# Patient Record
(demographics unavailable — no encounter records)

---

## 2024-11-08 NOTE — DISCUSSION/SUMMARY
[de-identified] : 40-year-old male status post L3 L5 laminectomy done for neurologic deficit that was caused by a work-related accident.  Patient is progressing appropriately.  Continue physical therapy.  Follow-up with me in 3 months.  Work documentation provided.  He is 100% temporarily disabled and advised to stay out of work.

## 2024-11-08 NOTE — IMAGING
[de-identified] : Right ankle dorsiflexion 5 out of 5 left ankle dorsiflexion 3-4 out of 5.  Lumbar incision healed appropriately.

## 2024-11-08 NOTE — HISTORY OF PRESENT ILLNESS
[de-identified] : 40-year-old male returns for follow-up.  She is status post L3 5 laminectomy discectomy done for severe enough spinal stenosis that he had neurologic injury.  All of this was because of a work-related injury.  He is improving.  He still has some tingling in his right foot and his left some back pain.

## 2025-02-05 NOTE — IMAGING
[de-identified] : 5 out of 5 right ankle dorsiflexion 3-5 left ankle dorsiflexion lumbar incision healed appropriately

## 2025-02-05 NOTE — HISTORY OF PRESENT ILLNESS
[de-identified] : 40-year-old male returns to me for follow-up.  He is status post L3 L5 laminectomy for lumbar radiculopathy and a bilateral foot drop.  This began because of a work-related accident that he sustained on July 22, 2024.  He denies any significant radicular pain.  He states that his right ankle dorsiflexion has returned to normal however his left ankle dorsiflexion is still weak.  He is in physical therapy next week.  It has been 7 months now since his injury.  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  When he walks longer distances he has difficulty ambulating because of the left foot drop

## 2025-02-05 NOTE — DISCUSSION/SUMMARY
[de-identified] : 40-year-old male with bilateral foot drop status post work injury on July 22, 2024.  The patient is status post L3 5 laminectomy.  He still has some persistent left ankle dorsiflexion weakness but slightly improved compared to prior to surgery.  His right ankle dorsiflexion has fully returned to normal given persistent symptoms I am ordering an EMG of his bilateral lower extremities.  The patient is currently 100% temporarily disabled and advised to stay out of work.  It is my opinion that the patient's symptoms are related to his work accident.  I will see him after the EMG is complete.  We will also consider sending him to foot and ankle depending with EMG results.

## 2025-03-21 NOTE — DISCUSSION/SUMMARY
[de-identified] : 41-year-old male with a left ankle foot drop.  This was after work injury.  Surgery resolved his right ankle and foot drop as well as his radiculopathy.  The left ankle joint persist.  I would have him follow-up with Dr. Tavares to discuss tendon transfer.  Patient temporarily disabled

## 2025-03-21 NOTE — HISTORY OF PRESENT ILLNESS
[de-identified] : 41-year-old male presents to me status post L3 5 laminectomy.  His he has some back pain. Denies any radiculopathy. Right foot drop improved. Left foot drop persists.  EMG was done.

## 2025-04-28 NOTE — HISTORY OF PRESENT ILLNESS
[de-identified] : 41-year-old patient here for his bilateral foot drop.  He had a back injury at work necessitating a back surgery.  He feels much better with back surgery but has noticed a persistent left foot weakness.  It has gotten better for him.

## 2025-04-28 NOTE — DATA REVIEWED
[FreeTextEntry1] : 3 views of the patient's bilateral feet ordered reviewed by me personally.  X-rays not demonstrate any fracture dislocation or malalignment.

## 2025-04-28 NOTE — PHYSICAL EXAM
[de-identified] : Bilaterally he has normal alignment to the ankle hindfoot midfoot forefoot.  On the right side he has 5 out of 5 strength.  On the left side there is 3 out of 5 strength with dorsiflexion eversion and there is no contracture.  He is neurovascular intact otherwise.   4 out of 5 plantarflexion inversion.

## 2025-04-28 NOTE — DISCUSSION/SUMMARY
[de-identified] : I recommended the patient at this time to continue with physical therapy.  I have prescribed an AFO brace in the left side to prevent further contracture from developing.  I will see him back in 2 months for reassessment.  Continue to work on stretching the Achilles.

## 2025-05-23 NOTE — DISCUSSION/SUMMARY
[de-identified] : 41-year-old male status post L3 5 laminectomy.  Patient is progressing.  Continue physical therapy.  Important for his recovery.  We will continue to monitor the foot drop.  I will see him back in August.  Temporary disabled and out of work

## 2025-05-23 NOTE — HISTORY OF PRESENT ILLNESS
[de-identified] : 41-year-old male presents today status post L3 5 laminectomy doing well.  No radicular pain continues to have the foot drop on the left continues to have some back pain posterior to it.